# Patient Record
Sex: FEMALE | Race: WHITE | Employment: PART TIME | URBAN - METROPOLITAN AREA
[De-identification: names, ages, dates, MRNs, and addresses within clinical notes are randomized per-mention and may not be internally consistent; named-entity substitution may affect disease eponyms.]

---

## 2020-11-02 ENCOUNTER — HOSPITAL ENCOUNTER (EMERGENCY)
Age: 20
Discharge: ANOTHER HOSPITAL | End: 2020-11-03
Attending: EMERGENCY MEDICINE
Payer: COMMERCIAL

## 2020-11-02 LAB
A/G RATIO: 1.5 (ref 1.1–2.2)
ACETAMINOPHEN LEVEL: <5 UG/ML (ref 10–30)
ALBUMIN SERPL-MCNC: 4.6 G/DL (ref 3.4–5)
ALP BLD-CCNC: 84 U/L (ref 40–129)
ALT SERPL-CCNC: 15 U/L (ref 10–40)
AMPHETAMINE SCREEN, URINE: POSITIVE
ANION GAP SERPL CALCULATED.3IONS-SCNC: 11 MMOL/L (ref 3–16)
AST SERPL-CCNC: 11 U/L (ref 15–37)
BARBITURATE SCREEN URINE: ABNORMAL
BASOPHILS ABSOLUTE: 0 K/UL (ref 0–0.2)
BASOPHILS RELATIVE PERCENT: 0.4 %
BENZODIAZEPINE SCREEN, URINE: ABNORMAL
BILIRUB SERPL-MCNC: 0.3 MG/DL (ref 0–1)
BUN BLDV-MCNC: 10 MG/DL (ref 7–20)
CALCIUM SERPL-MCNC: 9.2 MG/DL (ref 8.3–10.6)
CANNABINOID SCREEN URINE: POSITIVE
CHLORIDE BLD-SCNC: 104 MMOL/L (ref 99–110)
CO2: 22 MMOL/L (ref 21–32)
COCAINE METABOLITE SCREEN URINE: ABNORMAL
CREAT SERPL-MCNC: 0.6 MG/DL (ref 0.6–1.1)
EOSINOPHILS ABSOLUTE: 0.1 K/UL (ref 0–0.6)
EOSINOPHILS RELATIVE PERCENT: 0.5 %
ETHANOL: NORMAL MG/DL (ref 0–0.08)
GFR AFRICAN AMERICAN: >60
GFR NON-AFRICAN AMERICAN: >60
GLOBULIN: 3 G/DL
GLUCOSE BLD-MCNC: 109 MG/DL (ref 70–99)
HCG QUALITATIVE: NEGATIVE
HCT VFR BLD CALC: 41.3 % (ref 36–48)
HEMOGLOBIN: 13.9 G/DL (ref 12–16)
LYMPHOCYTES ABSOLUTE: 2.6 K/UL (ref 1–5.1)
LYMPHOCYTES RELATIVE PERCENT: 23.7 %
Lab: ABNORMAL
MCH RBC QN AUTO: 31 PG (ref 26–34)
MCHC RBC AUTO-ENTMCNC: 33.8 G/DL (ref 31–36)
MCV RBC AUTO: 91.9 FL (ref 80–100)
METHADONE SCREEN, URINE: ABNORMAL
MONOCYTES ABSOLUTE: 0.6 K/UL (ref 0–1.3)
MONOCYTES RELATIVE PERCENT: 5.5 %
NEUTROPHILS ABSOLUTE: 7.8 K/UL (ref 1.7–7.7)
NEUTROPHILS RELATIVE PERCENT: 69.9 %
OPIATE SCREEN URINE: ABNORMAL
OXYCODONE URINE: ABNORMAL
PDW BLD-RTO: 13.2 % (ref 12.4–15.4)
PH UA: 6.5
PHENCYCLIDINE SCREEN URINE: POSITIVE
PLATELET # BLD: 359 K/UL (ref 135–450)
PMV BLD AUTO: 6.9 FL (ref 5–10.5)
POTASSIUM REFLEX MAGNESIUM: 3.7 MMOL/L (ref 3.5–5.1)
PROPOXYPHENE SCREEN: ABNORMAL
RBC # BLD: 4.49 M/UL (ref 4–5.2)
SALICYLATE, SERUM: <0.3 MG/DL (ref 15–30)
SARS-COV-2, NAAT: NOT DETECTED
SODIUM BLD-SCNC: 137 MMOL/L (ref 136–145)
TOTAL PROTEIN: 7.6 G/DL (ref 6.4–8.2)
WBC # BLD: 11.1 K/UL (ref 4–11)

## 2020-11-02 PROCEDURE — 99285 EMERGENCY DEPT VISIT HI MDM: CPT

## 2020-11-02 PROCEDURE — U0002 COVID-19 LAB TEST NON-CDC: HCPCS

## 2020-11-02 PROCEDURE — G0480 DRUG TEST DEF 1-7 CLASSES: HCPCS

## 2020-11-02 PROCEDURE — 93005 ELECTROCARDIOGRAM TRACING: CPT | Performed by: EMERGENCY MEDICINE

## 2020-11-02 PROCEDURE — 6370000000 HC RX 637 (ALT 250 FOR IP)

## 2020-11-02 PROCEDURE — 84703 CHORIONIC GONADOTROPIN ASSAY: CPT

## 2020-11-02 PROCEDURE — 80053 COMPREHEN METABOLIC PANEL: CPT

## 2020-11-02 PROCEDURE — 85025 COMPLETE CBC W/AUTO DIFF WBC: CPT

## 2020-11-02 PROCEDURE — 80307 DRUG TEST PRSMV CHEM ANLYZR: CPT

## 2020-11-02 RX ORDER — SERTRALINE HYDROCHLORIDE 100 MG/1
100 TABLET, FILM COATED ORAL DAILY
COMMUNITY

## 2020-11-02 RX ORDER — ZIPRASIDONE HYDROCHLORIDE 20 MG/1
20 CAPSULE ORAL EVERY MORNING
COMMUNITY

## 2020-11-02 RX ORDER — VENLAFAXINE HYDROCHLORIDE 150 MG/1
300 CAPSULE, EXTENDED RELEASE ORAL DAILY
COMMUNITY

## 2020-11-02 RX ORDER — ZIPRASIDONE HYDROCHLORIDE 40 MG/1
40 CAPSULE ORAL NIGHTLY
COMMUNITY

## 2020-11-02 RX ORDER — LORAZEPAM 1 MG/1
TABLET ORAL
Status: COMPLETED
Start: 2020-11-02 | End: 2020-11-02

## 2020-11-02 RX ORDER — LORAZEPAM 2 MG/ML
0.5 INJECTION INTRAMUSCULAR ONCE
Status: DISCONTINUED | OUTPATIENT
Start: 2020-11-02 | End: 2020-11-02

## 2020-11-02 RX ORDER — LORAZEPAM 1 MG/1
1 TABLET ORAL EVERY 4 HOURS PRN
Status: DISCONTINUED | OUTPATIENT
Start: 2020-11-02 | End: 2020-11-03 | Stop reason: HOSPADM

## 2020-11-02 RX ADMIN — LORAZEPAM 1 MG: 1 TABLET ORAL at 20:11

## 2020-11-02 ASSESSMENT — ENCOUNTER SYMPTOMS
COLOR CHANGE: 0
CHEST TIGHTNESS: 0
SHORTNESS OF BREATH: 0
ABDOMINAL PAIN: 0
VOMITING: 0
DIARRHEA: 0
NAUSEA: 0
BACK PAIN: 0
CONSTIPATION: 0
COUGH: 0
RESPIRATORY NEGATIVE: 1

## 2020-11-02 ASSESSMENT — PAIN SCALES - GENERAL: PAINLEVEL_OUTOF10: 2

## 2020-11-03 VITALS
OXYGEN SATURATION: 96 % | TEMPERATURE: 98 F | HEART RATE: 91 BPM | BODY MASS INDEX: 33.74 KG/M2 | WEIGHT: 215 LBS | DIASTOLIC BLOOD PRESSURE: 67 MMHG | RESPIRATION RATE: 19 BRPM | SYSTOLIC BLOOD PRESSURE: 104 MMHG | HEIGHT: 67 IN

## 2020-11-03 LAB
EKG ATRIAL RATE: 77 BPM
EKG DIAGNOSIS: NORMAL
EKG P AXIS: 22 DEGREES
EKG P-R INTERVAL: 134 MS
EKG Q-T INTERVAL: 380 MS
EKG QRS DURATION: 96 MS
EKG QTC CALCULATION (BAZETT): 430 MS
EKG R AXIS: 37 DEGREES
EKG T AXIS: 28 DEGREES
EKG VENTRICULAR RATE: 77 BPM

## 2020-11-03 PROCEDURE — 93010 ELECTROCARDIOGRAM REPORT: CPT | Performed by: INTERNAL MEDICINE

## 2020-11-03 NOTE — ED NOTES
Pt resting quietly in bed, no s/s distress noted. Suicide precaution in place.  Phong Ruiz at bedside, due to, suicidal ideations. Patient in gown with ties cut off. Board above rMarina Del [place] removed from room for safety. Patients Room is free of all removable equipment and medical supplies and all medical cords/cables removed. Bedside cart locked. Will continue to monitor. Pt given warm blanket denies any needs.       Bj Barrera RN  11/03/20 7317

## 2020-11-03 NOTE — ED NOTES
Pt resting quietly in bed, no s/s distress noted. Suicide precaution in place.  Michael Greenberg at bedside, due to, suicidal ideations. Patient in gown with ties cut off. Board above gurney removed from room for safety. Patients Room is free of all removable equipment and medical supplies and all medical cords/cables removed. Bedside cart locked. Pt given new warm blankets. Denies any needs.          Rob Miller RN  11/03/20 3357

## 2020-11-03 NOTE — ED NOTES
Pt resting quietly in bed, no s/s distress noted. Suicide precaution in place.  renny  at bedside, due to, suicidal ideations. Patient in gown with ties cut off. .  Patients Room is free of all removable equipment and medical supplies and all medical cords/cables removed. Bedside cart locked. Will continue to monitor.                Tuan Schuster RN  11/03/20 6111

## 2020-11-03 NOTE — ED NOTES
Pt up to the bathroom gait steady, RN with pt in bathroom, pt given sandwich, cyndi crackers, juice. Pt remains safe. No s/s of distress. [facility] tech at bedside.           Jaime Cosme RN  11/03/20 6821

## 2020-11-03 NOTE — ED NOTES
Pt resting quietly in bed, no s/s distress noted. Suicide precaution in place.  Millicent Jacobson at bedside, due to, suicidal ideations. Patient in gown with ties cut off. Room remains safe, pt denies any needs.          Haydee Anderson RN  11/03/20 0368

## 2020-11-03 NOTE — ED PROVIDER NOTES
[facility]        Pt Name: Anju Nickerson  MRN: [id number]  [place] 2000  Date of evaluation: 11/2/2020  Provider: ISMA Rivas  PCP: Kenji Murillo     I have seen and evaluated this patient with my supervising physician Karely Carter DO.    CHIEF COMPLAINT       Chief Complaint   Patient presents with    Suicidal     Pt in from PD sent texts with suicidial thoughts to friend and then expressed same to PD, hold on chart from PD, hx of depression and suicidial ideation. HISTORY OF PRESENT ILLNESS   (Location, Timing/Onset, Context/Setting, Quality, Duration, Modifying Factors, Severity, Associated Signs and Symptoms)  Note limiting factors. Anju Nickerson is a 21 y.o. female with past medical history of depression who presents to the ED for suicidal ideation. Patient said she has had suicidal thoughts since she was 15years of age. Patient states she has been admitted to inpatient psych 6 times in the past.  States she currently follows up with a psychiatrist at the [facility]. Patient that she also is currently sees a therapist.  Patient states she recently had to switch her therapist and states she does not like her new therapist.  Patient states today she made the decision that she was not can go back to her new therapist.  Patient states she is on medication for depression and anxiety. Patient states she was recently started on Vyvanse and Geodon in the past week. Patient states she does take Effexor and Zoloft as well. Patient states she has chronic thoughts of suicide but states they have been more persistent over the past couple of days. Patient states she is concerned that she is going to act on them. Patient states she plans to overdose on her home medications.   States she does not take any access to medications at home prior to arrival.  States she did smoke Medications    LISDEXAMFETAMINE DIMESYLATE (VYVANSE PO)    Take by mouth Unknown dose    SERTRALINE (ZOLOFT) 100 MG TABLET    Take 100 mg by mouth daily    VENLAFAXINE (EFFEXOR XR) 150 MG EXTENDED RELEASE CAPSULE    Take 300 mg by mouth daily    ZIPRASIDONE (GEODON) 20 MG CAPSULE    Take 20 mg by mouth every morning    ZIPRASIDONE (GEODON) 40 MG CAPSULE    Take 40 mg by mouth nightly         ALLERGIES     Amoxicillin    FAMILYHISTORY     History reviewed. No pertinent family history. SOCIAL HISTORY       Social History     Tobacco Use    Smoking status: Never Smoker    Smokeless tobacco: Never Used   Substance Use Topics    Alcohol use: Not Currently    Drug use: Yes     Types: Marijuana       SCREENINGS             PHYSICAL EXAM    (up to 7 for level 4, 8 or more for level 5)     ED Triage Vitals [11/02/20 1936]   BP Temp Temp Source Pulse Resp SpO2 Height Weight   130/80 98.4 °F (36.9 °C) Oral 102 18 98 % 5' 7\" (1.702 m) 215 lb (97.5 kg)       Physical Exam  Constitutional:       General: She is not in acute distress. Appearance: Normal appearance. She is well-developed. She is not ill-appearing, toxic-appearing or diaphoretic. HENT:      Head: Normocephalic and atraumatic. Right Ear: External ear normal.      Left Ear: External ear normal.      Mouth/Throat:      Mouth: Mucous membranes are moist.      Pharynx: No oropharyngeal exudate or posterior oropharyngeal erythema. Eyes:      General:         Right eye: No discharge. Left eye: No discharge. Extraocular Movements: Extraocular movements intact. Conjunctiva/sclera: Conjunctivae normal.      Pupils: Pupils are equal, round, and reactive to light. Neck:      Musculoskeletal: Normal range of motion and neck supple. Cardiovascular:      Rate and Rhythm: Normal rate and regular rhythm. Pulses: Normal pulses. Heart sounds: Normal heart sounds. No murmur. No friction rub. No gallop.     Pulmonary:      Effort: Pulmonary effort is normal. No respiratory distress. Breath sounds: Normal breath sounds. No stridor. No wheezing, rhonchi or rales. Chest:      Chest wall: No tenderness. Abdominal:      General: Abdomen is flat. Bowel sounds are normal. There is no distension. Palpations: Abdomen is soft. There is no mass. Tenderness: There is no abdominal tenderness. There is no right CVA tenderness, left CVA tenderness, guarding or rebound. Hernia: No hernia is present. Musculoskeletal: Normal range of motion. Skin:     General: Skin is warm and dry. Coloration: Skin is not pale. Findings: No erythema or rash. Neurological:      General: No focal deficit present. Mental Status: She is alert and oriented to person, place, and time. GCS: GCS eye subscore is 4. GCS verbal subscore is 5. GCS motor subscore is 6. Cranial Nerves: Cranial nerves are intact. No cranial nerve deficit. Sensory: Sensation is intact. No sensory deficit. Motor: Motor function is intact. Coordination: Coordination is intact. Gait: Gait is intact. Gait normal.   Psychiatric:         Attention and Perception: Attention and perception normal.         Mood and Affect: Mood is depressed. Mood is not anxious or elated. Affect is flat. Affect is not labile, blunt, angry, tearful or inappropriate. Speech: Speech normal. She is communicative. Speech is not rapid and pressured or slurred. Behavior: Behavior is withdrawn. Behavior is not agitated, slowed, aggressive, hyperactive or combative. Behavior is cooperative. Thought Content: Thought content is not paranoid or delusional. Thought content includes suicidal ideation. Thought content does not include homicidal ideation. Thought content includes suicidal plan. Thought content does not include homicidal plan.          DIAGNOSTIC RESULTS   LABS:    Labs Reviewed   CBC WITH AUTO DIFFERENTIAL - Abnormal; Notable for the following components:       Result Value    WBC 11.1 (*)     Neutrophils Absolute 7.8 (*)     All other components within normal limits    Narrative:     Performed at:  [facility] [address]   Phone [phone]   COMPREHENSIVE METABOLIC PANEL W/ REFLEX TO MG FOR LOW K - Abnormal; Notable for the following components:    Glucose 109 (*)     AST 11 (*)     All other components within normal limits    Narrative:     Performed at:  [facility] [address]   Phone [phone]   ACETAMINOPHEN LEVEL - Abnormal; Notable for the following components:    Acetaminophen Level <5 (*)     All other components within normal limits    Narrative:     Performed at:  [facility] [address]   Phone [phone]   SALICYLATE LEVEL - Abnormal; Notable for the following components:    Salicylate, Serum <7.0 (*)     All other components within normal limits    Narrative:     Performed at:  [facility] [address]   Phone [phone]   [address] - Abnormal; Notable for the following components:    Amphetamine Screen, Urine POSITIVE (*)     Cannabinoid Scrn, Ur POSITIVE (*)     PCP Screen, Urine POSITIVE (*)     All other components within normal limits    Narrative:     Performed at:  [facility] [address]   Phone [phone]   ETHANOL    Narrative:     Performed at:  [facility] [address]   Phone [phone]   HCG, SERUM, QUALITATIVE    Narrative:     Performed at:  [facility] [address]   Phone [phone]       All other labs were within normal range or not returned as of this dictation.     EKG: All EKG's are interpreted by the Emergency Department Physician in the absence of a cardiologist.  Please see their note for interpretation of EKG. RADIOLOGY:   Non-plain film images such as CT, Ultrasound and MRI are read by the radiologist. Plain radiographic images are visualized and preliminarily interpreted by the ED Provider with the below findings:        Interpretation per the Radiologist below, if available at the time of this note:    No orders to display     No results found. PROCEDURES   Unless otherwise noted below, none     Procedures    CRITICAL CARE TIME   N/A    CONSULTS:  None      EMERGENCY DEPARTMENT COURSE and DIFFERENTIAL DIAGNOSIS/MDM:   Vitals:    Vitals:    11/02/20 1936   BP: 130/80   Pulse: 102   Resp: 18   Temp: 98.4 °F (36.9 °C)   TempSrc: Oral   SpO2: 98%   Weight: 215 lb (97.5 kg)   Height: 5' 7\" (1.702 m)       Patient was given the following medications:  Medications   LORazepam (ATIVAN) tablet 1 mg (1 mg Oral Given 11/2/20 2011)           Patient is a 51-year-old female who presents to the ED with complaint of suicidal ideation. Longstanding history of chronic suicidal ideation with associated depression. Increased thoughts over the past couple of days. Apparently was texting friend that she was going to harm herself. Patient states specific plan of overdosing on her home medications. Does admit to depression with suicidal thoughts to myself and police prior to arrival.  Patient complaining of difficulty sleeping. Does appear to be stressed and anxious and given dose of oral Ativan here in ED. CBC showed white count 11.1 with normal hemoglobin and platelets. CMP unremarkable. Acetaminophen, salicylate and ethanol negative. Pregnancy negative. Urine drug screen positive for amphetamines, PCP and cannabinoids. Patient on psych medications at home and may play portion of the patient's positive urine drug screen. Does not need to cannabinoids at home.   Patient at this time appears medically clear for transfer to psychiatric facility. 72-hour hold was signed upon arrival by police. Patient suffering from depression with suicidal ideation and associated plan. Do not believe patient safe for outpatient management. Believe would benefit from transfer to psychiatric facility for further evaluation and treatment. Patient medically clear at this time for transfer. FINAL IMPRESSION      1. Depression with suicidal ideation          DISPOSITION/PLAN   DISPOSITION Decision To Transfer 11/02/2020 08:40:45 PM      PATIENT REFERREDTO:  No follow-up provider specified.     DISCHARGE MEDICATIONS:  New Prescriptions    No medications on file       DISCONTINUED MEDICATIONS:  Discontinued Medications    No medications on file              (Please note that portions of this note were completed with a voice recognition program.  Efforts were made to edit the dictations but occasionally words are mis-transcribed.)    ISMA Paige (electronically signed)          ISMA Sullivan  11/02/20 2045

## 2020-11-03 NOTE — ED NOTES
Pt remains in room with sitter at bedside, room remains safe, suicide precautions remain in place     [address] RN  11/02/20 4645

## 2020-11-03 NOTE — ED PROVIDER NOTES
I received this patient in sign out. Briefly pt has suicidal thoughts which are becoming more and more frequent. She texted a friend that she was going to hurt her self.     On exam pt is resting comfortably  Non labored breaths  Vital signs appropriate    Medications   LORazepam (ATIVAN) tablet 1 mg (1 mg Oral Given 11/2/20 2011)     Labs Reviewed   CBC WITH AUTO DIFFERENTIAL - Abnormal; Notable for the following components:       Result Value    WBC 11.1 (*)     Neutrophils Absolute 7.8 (*)     All other components within normal limits    Narrative:     Performed at:  [facility]  [address]   Phone [phone]   COMPREHENSIVE METABOLIC PANEL W/ REFLEX TO MG FOR LOW K - Abnormal; Notable for the following components:    Glucose 109 (*)     AST 11 (*)     All other components within normal limits    Narrative:     Performed at:  [facility]  [address]   Phone [phone]   ACETAMINOPHEN LEVEL - Abnormal; Notable for the following components:    Acetaminophen Level <5 (*)     All other components within normal limits    Narrative:     Performed at:  [facility]  [place]   Phone [phone]   SALICYLATE LEVEL - Abnormal; Notable for the following components:    Salicylate, Serum <2.1 (*)     All other components within normal limits    Narrative:     Performed at:  [facility]  [place]   Phone [phone]   [address] - Abnormal; Notable for the following components:    Amphetamine Screen, Urine POSITIVE (*)     Cannabinoid Scrn, Ur POSITIVE (*)     PCP Screen, Urine POSITIVE (*)     All other components within normal limits    Narrative:     Performed at:  [facility]  [place]   Phone [phone]   ETHANOL    Narrative:     Performed at:  [facility]  [address],  [place], [address]   Phone [phone]   HCG, SERUM, QUALITATIVE    Narrative:     Performed at:  [facility]  [address],  [place], [address]   Phone [phone]    Narrative:     Performed at:  [facility]  [address],  [place], [address]   Phone [phone]     Patient is 66-year-old female longstanding psychiatric history and significant depression presenting to the ER after texting a friend that she would hurt herself. She has been calm and cooperative here. Did not require any medications under my care.   She was transferred to [facility] for further psychiatric evaluation.    '       Jose Brito MD  11/03/20 6689       Jose Brito MD  11/03/20 7015

## 2020-11-03 NOTE — ED NOTES
A safety risk assessment of the patient environment was conducted and the room was modified for safety. The room is free of all removed equipment, medical supplies, and articles that may pose a threat to the patient or others such as sharp items and needle boxes. Items were removed from unlocked drawers, cabinets are locked when locks are available, extra linens, medical cords, cables, pictures from wall, ect. Are all removed. The patient call light was left in place due to the medical nature of the unit ad the needs of the patient. The patient was place in a room close to the nursing desk. The patient was placed in a hospital gown. A search of the patient and patient environment was performed. Two staff members (including ) conducted a witnessed search of all belongings in the presence of the patient. All personal items including sharp objects, belts, ties, and ingestibles were removed and secured. The patient and family were educated regarding items brought in by family members and visitors will be searched to verify that no potentially dangerous items are brought in to the patient. If a visitor refuses search of items- visitor will be instructed that the items cannot enter patient room. Patient  at bedside. Bed locked and in lowest position with both side rails raised. Call light within reach. Will monitor pt. hourly.         [address], RN  11/02/20 1946

## 2020-11-03 NOTE — ED NOTES
Report received from [facility]. Pt resting quietly in bed, no s/s distress noted. Suicide precaution in place.  Jaylin Gomez at bedside, due to, suicidal ideations. Patient in gown with ties cut off. Board [place] removed from room for safety. Patients Room is free of all removable equipment and medical supplies and all medical cords/cables removed. Bedside cart locked. Will continue to monitor.              Heaven Samuel RN  11/03/20 20 Ciarra Tate RN  11/03/20 1858

## 2020-11-03 NOTE — ED PROVIDER NOTES
I independently performed a history and physical on Kaela Irwin. All diagnostic, treatment, and disposition decisions were made by myself in conjunction with the advanced practice provider. Briefly, this is a 21 y.o. female here for suicidal ideation. Arrived to ED under police hold, per police officers had been texting a friend that she was going to kill herself. Patient reports to me a plan to overdose on her pills, also states to me \"if I had a gun I would use that because it would be . \"     On exam, Patient afebrile and nontoxic. No distress, calm and cooperative. Heart RRR. Lungs CTAB. Abdomen soft, nondistended, nontender to palpation in all quadrants. A&Ox4, speech clear. 5/5 motor and sensation grossly intact all extremities. EKG  EKG was reviewed by emergency department physician in the absence of a cardiologist    Narrow complex sinus rhythm, rate 77, normal axis, normal PA and QRS intervals, normal Qtc, no ST elevations or depressions, normal t-wave morphology, impression NSR, no STEMI, no comparison available      Screenings            MDM    Patient afebrile and nontoxic. She is in no distress. AVSS. Physical exam is benign, she is without clinical evidence of intoxication. EKG NSR. Tox workup with UDS positive for amphetamines, marijuana and PCP, was otherwise unremarkable. No findings to suggest infectious etiology. Patient with extensive psychiatric history with active suicidal ideation and plan, would benefit from transfer for psychiatric evaluation. Not safe for discharge at this time. She is medically cleared for transfer. Patient Referrals:  No follow-up provider specified. Discharge Medications:  Discharge Medication List as of 11/3/2020  6:40 AM          FINAL IMPRESSION  1. Depression with suicidal ideation        Blood pressure 104/67, pulse 91, temperature 98 °F (36.7 °C), temperature source Oral, resp.  rate 19, height 5' 7\" (1.702 m), weight 215